# Patient Record
Sex: FEMALE | Race: WHITE
[De-identification: names, ages, dates, MRNs, and addresses within clinical notes are randomized per-mention and may not be internally consistent; named-entity substitution may affect disease eponyms.]

---

## 2018-07-12 ENCOUNTER — HOSPITAL ENCOUNTER (OUTPATIENT)
Dept: HOSPITAL 97 - ER | Age: 68
Setting detail: OBSERVATION
LOS: 1 days | Discharge: HOME | End: 2018-07-13
Attending: FAMILY MEDICINE | Admitting: FAMILY MEDICINE
Payer: COMMERCIAL

## 2018-07-12 VITALS — BODY MASS INDEX: 28.6 KG/M2

## 2018-07-12 DIAGNOSIS — D46.9: ICD-10-CM

## 2018-07-12 DIAGNOSIS — I25.10: ICD-10-CM

## 2018-07-12 DIAGNOSIS — I13.0: ICD-10-CM

## 2018-07-12 DIAGNOSIS — E87.5: ICD-10-CM

## 2018-07-12 DIAGNOSIS — R53.1: Primary | ICD-10-CM

## 2018-07-12 DIAGNOSIS — J44.9: ICD-10-CM

## 2018-07-12 DIAGNOSIS — N18.9: ICD-10-CM

## 2018-07-12 DIAGNOSIS — K21.9: ICD-10-CM

## 2018-07-12 DIAGNOSIS — I50.9: ICD-10-CM

## 2018-07-12 LAB
ALBUMIN SERPL BCP-MCNC: 3.1 G/DL (ref 3.4–5)
ALP SERPL-CCNC: 240 U/L (ref 45–117)
ALT SERPL W P-5'-P-CCNC: 33 U/L (ref 12–78)
AST SERPL W P-5'-P-CCNC: 22 U/L (ref 15–37)
BUN BLD-MCNC: 48 MG/DL (ref 7–18)
GLUCOSE SERPLBLD-MCNC: 108 MG/DL (ref 74–106)
HCT VFR BLD CALC: 29.5 % (ref 36–45)
INR BLD: 1.13
LYMPHOCYTES # SPEC AUTO: 1.3 K/UL (ref 0.7–4.9)
MAGNESIUM SERPL-MCNC: 2.7 MG/DL (ref 1.8–2.4)
MCH RBC QN AUTO: 31.8 PG (ref 27–35)
MCV RBC: 90.9 FL (ref 80–100)
NT-PROBNP SERPL-MCNC: 808 PG/ML (ref ?–125)
PMV BLD: 8.1 FL (ref 7.6–11.3)
POTASSIUM SERPL-SCNC: 6 MMOL/L (ref 3.5–5.1)
RBC # BLD: 3.25 M/UL (ref 3.86–4.86)

## 2018-07-12 PROCEDURE — 84439 ASSAY OF FREE THYROXINE: CPT

## 2018-07-12 PROCEDURE — 82607 VITAMIN B-12: CPT

## 2018-07-12 PROCEDURE — 71045 X-RAY EXAM CHEST 1 VIEW: CPT

## 2018-07-12 PROCEDURE — 86850 RBC ANTIBODY SCREEN: CPT

## 2018-07-12 PROCEDURE — 83540 ASSAY OF IRON: CPT

## 2018-07-12 PROCEDURE — 85025 COMPLETE CBC W/AUTO DIFF WBC: CPT

## 2018-07-12 PROCEDURE — 80069 RENAL FUNCTION PANEL: CPT

## 2018-07-12 PROCEDURE — 83880 ASSAY OF NATRIURETIC PEPTIDE: CPT

## 2018-07-12 PROCEDURE — 85730 THROMBOPLASTIN TIME PARTIAL: CPT

## 2018-07-12 PROCEDURE — 96375 TX/PRO/DX INJ NEW DRUG ADDON: CPT

## 2018-07-12 PROCEDURE — 80048 BASIC METABOLIC PNL TOTAL CA: CPT

## 2018-07-12 PROCEDURE — 86900 BLOOD TYPING SEROLOGIC ABO: CPT

## 2018-07-12 PROCEDURE — 86901 BLOOD TYPING SEROLOGIC RH(D): CPT

## 2018-07-12 PROCEDURE — 36415 COLL VENOUS BLD VENIPUNCTURE: CPT

## 2018-07-12 PROCEDURE — 94760 N-INVAS EAR/PLS OXIMETRY 1: CPT

## 2018-07-12 PROCEDURE — 94640 AIRWAY INHALATION TREATMENT: CPT

## 2018-07-12 PROCEDURE — 84443 ASSAY THYROID STIM HORMONE: CPT

## 2018-07-12 PROCEDURE — 82728 ASSAY OF FERRITIN: CPT

## 2018-07-12 PROCEDURE — 85610 PROTHROMBIN TIME: CPT

## 2018-07-12 PROCEDURE — 97163 PT EVAL HIGH COMPLEX 45 MIN: CPT

## 2018-07-12 PROCEDURE — 76770 US EXAM ABDO BACK WALL COMP: CPT

## 2018-07-12 PROCEDURE — 81003 URINALYSIS AUTO W/O SCOPE: CPT

## 2018-07-12 PROCEDURE — 84466 ASSAY OF TRANSFERRIN: CPT

## 2018-07-12 PROCEDURE — 96361 HYDRATE IV INFUSION ADD-ON: CPT

## 2018-07-12 PROCEDURE — 93005 ELECTROCARDIOGRAM TRACING: CPT

## 2018-07-12 PROCEDURE — 96365 THER/PROPH/DIAG IV INF INIT: CPT

## 2018-07-12 PROCEDURE — 85044 MANUAL RETICULOCYTE COUNT: CPT

## 2018-07-12 PROCEDURE — 86870 RBC ANTIBODY IDENTIFICATION: CPT

## 2018-07-12 PROCEDURE — 80076 HEPATIC FUNCTION PANEL: CPT

## 2018-07-12 PROCEDURE — 82746 ASSAY OF FOLIC ACID SERUM: CPT

## 2018-07-12 PROCEDURE — 83970 ASSAY OF PARATHORMONE: CPT

## 2018-07-12 PROCEDURE — 80053 COMPREHEN METABOLIC PANEL: CPT

## 2018-07-12 PROCEDURE — 99284 EMERGENCY DEPT VISIT MOD MDM: CPT

## 2018-07-12 PROCEDURE — 83735 ASSAY OF MAGNESIUM: CPT

## 2018-07-12 PROCEDURE — 84484 ASSAY OF TROPONIN QUANT: CPT

## 2018-07-12 RX ADMIN — MORPHINE SULFATE SCH MG: 15 TABLET, FILM COATED, EXTENDED RELEASE ORAL at 21:09

## 2018-07-12 RX ADMIN — SODIUM CHLORIDE SCH MLS: 0.9 INJECTION, SOLUTION INTRAVENOUS at 17:56

## 2018-07-12 RX ADMIN — CARVEDILOL SCH MG: 25 TABLET, FILM COATED ORAL at 21:08

## 2018-07-12 RX ADMIN — ALLOPURINOL SCH MG: 100 TABLET ORAL at 21:09

## 2018-07-12 RX ADMIN — Medication SCH: at 21:00

## 2018-07-12 RX ADMIN — IPRATROPIUM BROMIDE SCH MG: 0.5 SOLUTION RESPIRATORY (INHALATION) at 19:29

## 2018-07-12 RX ADMIN — SODIUM BICARBONATE TAB 325 MG SCH MG: 325 TAB at 21:08

## 2018-07-12 RX ADMIN — ALBUTEROL SULFATE SCH MG: 2.5 SOLUTION RESPIRATORY (INHALATION) at 19:29

## 2018-07-12 RX ADMIN — HYDRALAZINE HYDROCHLORIDE SCH MG: 25 TABLET, FILM COATED ORAL at 21:08

## 2018-07-12 NOTE — CON
Date of Consultation:  07/12/2018



Consulting Physician:  Dr. Suzette Alfred.



Reason For Consultation:  Elevated BUN and creatinine, hyperkalemia.



History Of Present Illness:  This is a pleasant 68-year-old female with significant past medical hist
ory of coronary artery disease status post MI back in February this year, complicated with congestive
 heart failure, MDS, COPD, osteoarthritis.  According to the patient, she is known to have chronic ki
dney disease, but she does not know the degree of it and the patient apparently had chronic acidosis 
being on oral sodium bicarb.  The patient came visiting from [facility], started having some diarrhea,
 abdominal pain, nausea without any vomiting for the last couple of days.  For that reason, reported 
to the emergency room.  In the emergency room, a primary workup showed elevated BUN and creatinine, B
UN 48, creatinine 2.8 with GFR around 17.  For that reason, the patient is admitted and we have been 
consulted.  The patient denied taking any nonsteroidal.  No recent change in her medication.  The [facility]
ient had contrast with cardiac cath and PTCA back in April of this year. 



The patient started on treatment for her hyperkalemia and we have been consulted. 



Blood pressure has been stable for the patient.



Past Medical History:  Include:

1.Hypertension.

2.Coronary artery disease status post PTCA status post MI back in April of this year.

3.COPD.

4.MDS.

5.Osteoarthritis.



Social History:  The patient is [place] from [place] of state.  Denies smoking, denies drinking, denies 
drug abuse.



Past Surgical History:  Include PTCA.



Allergies:  NO KNOWN DRUGS ALLERGY.



Home Medications:  Include:

1.Pepcid.

2.Pantoprazole.

3.Iron sulfate.

4.Sodium bicarb.



Review of Systems:

Head and Neck:  No red eye.  No ear pain. 

GI:  Has nausea, has diarrhea. 

:  No polyuria.  No dysuria.  No hematuria. 

GYN:  No vaginal discharge. 

Respiratory:  No shortness of breath. 

Cardiovascular:  No chest pain. 

Neuro:  No neuropathy. 

Musculoskeletal:  No joint pain. 

Endocrine:  No polydipsia. 

Skin:  No rash.



Physical Examination:

Vital Signs:  When I saw the patient, blood pressure 110/78, pulse of 88. 

Chest:  Clear to auscultation. 

Heart:  S1, S2.  Regular. 

Abdomen:  Soft, nontender. 

Extremities:  No edema.



Laboratory Data:  Sodium 136, potassium 6, bicarb 21, BUN 48, creatinine 2.8, calcium 9.1, magnesium 
2.7.  LFT within normal limit.  , albumin 3.1.  WBC 10.3, H and H 10.3/29.5, platelets 307, IN
R 1.1.



Assessment And Plan:  

1.Renal failure, mostly chronic kidney disease secondary to cardiorenal, stable with hyperkalemia, m
ostly had component of acute kidney injury secondary to prerenal.  I do not see any medication.

2.I agree with the IV fluid.  We will bolus the patient with 500 of normal saline and we will send f
or full workup.

3.Hyperkalemia secondary to renal failure.  I am going to go ahead and send for fecal occult, send f
or TSH and we will start aggressive hydration to compensate.  Give the patient albuterol D50 insulin 
and calcium gluconate and we will follow up.  Repeat.

4.Coronary artery disease.  Stable.  

5.Acidosis.  We will continue her oral bicarb.  

The patient's case is discussed with Dr. Alfred, agreed on the plan.  Discussed with the patient, verb
alized understanding.





MA/CARLIN

DD:  07/12/2018 14:04:04Voice ID:  [id number]

DT:  07/12/2018 14:34:32Report ID:  [id number]

## 2018-07-12 NOTE — EKG
Test Date:    2018-07-12               Test Time:    13:04:28

Technician:   BOBBI                                     

                                                     

MEASUREMENT RESULTS:                                       

Intervals:                                           

Rate:         69                                     

KS:           162                                    

QRSD:         90                                     

QT:           420                                    

QTc:          450                                    

Axis:                                                

P:            33                                     

KS:           162                                    

QRS:          50                                     

T:            61                                     

                                                     

INTERPRETIVE STATEMENTS:                                       

                                                     

Normal sinus rhythm

Cannot rule out Anterior infarct, age undetermined

Abnormal ECG

No previous ECG available for comparison



Electronically Signed On 07-12-18 15:06:28 CDT by Antonio Colvin

## 2018-07-12 NOTE — HP
Date of Admission:  07/12/2018



Consultants:  Deandre Khan M.D., with Nephrology.



Primary Care Physician:  [facility] [place], [place]



Chief Complaint:  Generalized weakness.



History Of Present Illness:  The patient is a 68-year-old female with past 
medical history of myelodysplastic syndrome, COPD, congestive heart failure, 
chronic kidney disease, coronary artery disease, hypertension, GERD, comes in 
with 2-day history of generalized weakness, fatigue, some nausea, no vomiting.  
No fever.  However, the patient does have some chills.  Denies any cough, chest 
pain, or ill contacts.  Does report some shortness of breath, which is a little 
bit above her baseline.  The patient's symptoms are constant, moderate, 
progressively worsening.  When she came into the ER, her vital signs were 
stable.  She was afebrile.  Her workup revealed a potassium level of 6, 
creatinine was 2.8, with unknown baseline, however, the patient does have 
chronic kidney disease.  Her white count was normal.  Her chest x-ray did not 
reveal any edema or infiltrates.  The patient was given medications to reduce 
her potassium level including insulin, calcium, and Kayexalate.  The patient 
was then referred for admission.  When seen in the ER, she was awake, alert, 
oriented x3, not in any acute distress.



Past Medical History:  Myelodysplastic syndrome, COPD, chronic kidney disease, 
congestive heart failure, coronary artery disease.  GERD, and hypertension.  
The patient has received 12 transfusions in the past for her myelodysplasia.



Past Surgical History:  Ankle surgery on the left with pinning, multiple 
surgeries for trauma to the abdomen and back.



Allergies:  EZETIMIBE, NIACIN, ROSUVASTATIN, LORAZEPAM.



Medications:  List reviewed.



Social History:  The patient lives in [place], visiting from out of [facility].  Has 
2 sons.  Good social support.  Mainly independent in her activities of daily 
living.



Family History:  No history of premature coronary artery disease in the family.



Review of Systems:

An 11-point system reviewed, negative except as per HPI.



Physical Examination:

Vital Signs:  Temperature 97.9, heart rate 64, blood pressure 127/62, 
respirations 22, and O2 saturation 97% on room air. 

General:  Awake, alert, oriented x3, in some mild distress.  Elderly female, 
somewhat ill-appearing, obese. 

HEENT:  Normocephalic, atraumatic.  PERRLA.  EOMI.  Dry mucous membranes.  
Oropharynx is clear.  Poor dentition.  Conjunctivae anicteric. 

Neck:  Supple.  No JVD.  Trachea midline. 

CV:  S1 and S2.  Regular rate and rhythm.  Peripheral pulses are present. 

Respiratory:  Clear to auscultation bilaterally.  No wheezing.  No stridor.  No 
use of accessory muscles Gastrointestinal:  Abdomen is soft, nontender, 
nondistended.  Positive bowel sounds.  No guarding or rigidity. 

Extremities:  No clubbing, cyanosis.  The patient does have some edema on the 
right lower extremity. 

Neuro:  Cranial nerves 2 through 12 intact grossly.  No focal neurological 
deficit.  Speech is normal.  Sensation intact to light touch.  Strength is 
symmetric in bilateral upper and lower extremities. 

Skin:  No rashes, normal skin turgor. 

Psych:  Mood is okay.  Affect is full.  Insight and judgment are good.



Laboratory Data:  Sodium 136, potassium 6, chloride 107, CO2 21, BUN 48, 
creatinine 2.8, glucose 108, calcium 9.1, and magnesium 2.7.  Troponin less 
than 0.02.  .  Albumin 3.1.  UA, negative nitrite, trace leukocyte 
esterase, trace glucose, 3+ protein.  INR 1.13.  WBC 10.3, H and H 10.3, 29.5, 
platelets 307, and neutrophils 75.1.  Chest x-ray shows no acute abnormalities 
displayed.  Renal ultrasound shows echogenic kidneys bilaterally, most 
compatible with medical renal disease.  Benign bilateral renal cysts.  EKG, 
normal sinus rhythm, rate of 69, no ST changes.



Assessment And Plan:  A 68-year-old female with;

1.   Generalized weakness.  We will obtain PT eval, likely secondary to 
electrolyte disturbance.

2.   Hyperkalemia.  The patient has already received calcium, insulin, dextrose 
and Kayexalate.  We will repeat potassium level and continue to monitor.

3.   Chronic kidney disease.  Unknown baseline.  The patient does have some 
acute characteristics to the kidney injury.  Continue with IV fluid hydration.  
The patient is on the dry side.  Dr. Khan with Nephrology has been 
consulted.

4.   Myelodysplastic syndrome.  We will monitor H and H.

5.   Chronic obstructive pulmonary disease, chronic bronchitis.  We will 
continue nebulizer treatments.

6.   Congestive heart failure.  Unknown EF, chronic, compensated.

7.   Coronary artery disease native artery and native heart without angina.

8.   Gastroesophageal reflux disease on PPI.

9.   Essential hypertension stable.  We will resume home medications once 
reconciled.

10.   Gastrointestinal and deep venous thrombosis prophylaxis with PPI and 
Lovenox renally dosed.



Plan:  Admit the patient to Med-Surg, [facility] as observation.

Code status: Full code





SA/MODL

DD:  07/12/2018 16:37:32   Voice ID:  [id number]

MTDD

## 2018-07-12 NOTE — RAD REPORT
EXAM DESCRIPTION:  US - Renal Ultrasound-Complete - 7/12/2018 2:40 pm

 

CLINICAL HISTORY:  NATALIA

Flank pain

 

COMPARISON:  No comparisons

 

FINDINGS:  Both kidneys are echogenic. Several benign renal cysts are present bilaterally.

 

The right kidney measures 9.5 x 5.2 x 5.2 cm. No hydronephrosis, focal mass or perinephric fluid.

 

The left kidney measures 9.6 x 5.4 x 4.8 cm. No hydronephrosis, focal mass or perinephric fluid.

 

The urinary bladder is incompletely distended without gross abnormality seen.

 

IMPRESSION:  Echogenic kidneys bilaterally most compatible with medical renal disease.

 

Benign bilateral renal cysts.

## 2018-07-12 NOTE — EDPHYS
Physician Documentation                                                                           

 [facility]                                                                

Name: Radha Leiva                                                                               

Age: 68 yrs                                                                                       

Sex: Female                                                                                       

: 1950                                                                                   

MRN: [id number]                                                                                   

Arrival Date: 2018                                                                          

Time: 11:13                                                                                       

Account#: [id number]                                                                            

Bed 5                                                                                             

Private MD: out of town, doctor                                                                   

ED Physician Guero Banda                                                                       

HPI:                                                                                              

                                                                                             

07:18 This 68 yrs old  Female presents to ER via Wheelchair with complaints of       kdr 

      Weakness light headed .                                                                     

07:18 The patient has a history of anemia and 12 transfusions last year. none this year and   kdr 

      since yesterday, the patient c/o her typical pre-transfusion weakness similar to her        

      prior episodes where she required transfusions. She has had no other c/o in the ED..        

      Onset: The symptoms/episode began/occurred yesterday. Severity of symptoms: At their        

      worst the symptoms were mild in the emergency department the symptoms are unchanged.        

      The patient has experienced similar episodes in the past, multiple times. The patient       

      has not recently seen a physician.                                                          

                                                                                                  

Historical:                                                                                       

- Allergies:                                                                                      

                                                                                             

11:23 Ativan;                                                                                 aj1 

11:23 Crestor;                                                                                aj1 

11:23 Zetia;                                                                                  aj1 

11:23 Niacin;                                                                                 aj1 

- Home Meds:                                                                                      

11:23 cyclobenzaprine 10 mg Oral tab 1 tab 3 times per day [Active]; MS Contin 15 mg Oral     aj1 

      TbER 1 tab every 8 hours [Active]; pantoprazole 40 mg oral TbEC 1 tab once daily            

      [Active]; Percocet  mg Oral tab 1 tab every 4 hours [Active]; Plavix 75 mg Oral       

      tab 1 tab once daily [Active]; Sodium Bicarbonate 150 mg Oral twice a day [Active];         

      allopurinol 100 mg Oral tab 1 tab 2 times per day [Active]; aspirin 81 mg Oral chew 1       

      tab once daily [Active]; Atarax Oral 25 mg daily [Active]; calcium plus vitamin d twice     

      a day [Active]; carvedilol 25 mg oral tab 1 tab 2 times per day [Active]; Combivent         

       mcg/actuation Inhl aero 2 puffs 4 times per day [Active]; famotidine 40 mg Oral      

      tab 1 tab once daily [Active]; Ferrous Sulfate Oral twice daily [Active]; hydralazine       

      50 mg Oral tab 1 tab 2 times per day [Active]; Lasix 80 mg Oral tab 1 tab once daily        

      [Active]; omeprazole 20 mg Oral cpDR 1 cap once daily [Active]; Paxil 40 mg Oral tab 1      

      tab once daily [Active]; Zyrtec 10 mg Oral chew 1 tab once daily [Active];                  

- PMHx:                                                                                           

11:23 Anemia; COPD; CHF; myelodysplatic syndrome; GERD;                                       aj1 

                                                                                                  

- Immunization history:: Flu vaccine is not up to date.                                           

- Social history:: Smoking status: Patient/guardian denies using tobacco.                         

- Ebola Screening: : Patient denies travel to an Ebola-affected area in the 21 days               

  before illness onset.                                                                           

                                                                                                  

                                                                                                  

ROS:                                                                                              

                                                                                             

07:18 Constitutional: Negative for fever, chills, and weight loss - she has been generally    kdr 

      weak Eyes: Negative for injury, pain, redness, and discharge, ENT: Negative for injury,     

      pain, and discharge, Neck: Negative for injury, pain, and swelling, Cardiovascular:         

      Negative for chest pain, palpitations, and edema, Respiratory: Negative for shortness       

      of breath, cough, wheezing, and pleuritic chest pain, Abdomen/GI: Negative for              

      abdominal pain, nausea, vomiting, diarrhea, and constipation, Back: Negative for injury     

      and pain, : Negative for injury, bleeding, discharge, and swelling, MS/Extremity:         

      Negative for injury and deformity, Skin: Negative for injury, rash, and discoloration,      

      Neuro: Negative for headache, weakness, numbness, tingling, and seizure activity.           

      Psych: Negative for depression, anxiety, suicide ideation, homicidal ideation, and          

      hallucinations, Allergy/Immunology: Negative for hives, rash, and allergies, Endocrine:     

      Negative for neck swelling, polydipsia, polyuria, polyphagia, and marked weight             

      changes, Hematologic/Lymphatic: Negative for swollen nodes, abnormal bleeding, and          

      unusual bruising.                                                                           

                                                                                                  

Exam:                                                                                             

07:18 Constitutional:  This is a well developed, well nourished patient who is awake, alert,  kdr 

      and in no acute distress. Head/Face:  Normocephalic, atraumatic. Eyes:  Pupils equal        

      round and reactive to light, extra-ocular motions intact.  Lids and lashes normal.          

      Conjunctiva and sclera are non-icteric and not injected.  Cornea within normal limits.      

      Periorbital areas with no swelling, redness, or edema. Neck:  Trachea midline, no           

      thyromegaly or masses palpated, and no cervical lymphadenopathy.  Supple, full range of     

      motion without nuchal rigidity, or vertebral point tenderness.  No Meningismus.             

      Chest/axilla:  Normal chest wall appearance and motion.  Nontender with no deformity.       

      No lesions are appreciated. Cardiovascular:  Regular rate and rhythm with a normal S1       

      and S2.  No gallops, murmurs, or rubs.  Normal PMI, no JVD.  No pulse deficits.             

      Respiratory:  Lungs have equal breath sounds bilaterally, clear to auscultation and         

      percussion.  No rales, rhonchi or wheezes noted.  No increased work of breathing, no        

      retractions or nasal flaring. Abdomen/GI:  Soft, non-tender, with normal bowel sounds.      

      No distension or tympany.  No guarding or rebound.  No evidence of tenderness               

      throughout. Back:  No spinal tenderness.  No costovertebral tenderness.  Full range of      

      motion. Skin:  Warm, dry with normal turgor.  Normal color with no rashes, no lesions,      

      and no evidence of cellulitis. MS/ Extremity:  Pulses equal, no cyanosis.                   

      Neurovascular intact.  Full, normal range of motion. Neuro:  Awake and alert, GCS 15,       

      oriented to person, place, time, and situation.  Cranial nerves II-XII grossly intact.      

      Motor strength 5/5 in all extremities.  Sensory grossly intact.  Cerebellar exam            

      normal.  Normal gait. Psych:  Awake, alert, with orientation to person, place and time.     

       Behavior, mood, and affect are within normal limits.                                       

                                                                                                  

Vital Signs:                                                                                      

                                                                                             

11:23  / 62; Pulse 64; Resp 22; Temp 97.9; Pulse Ox 97% on R/A; Weight 78.02 kg (R);    aj1 

      Height 5 ft. 5 in. (165.10 cm) (R); Pain 8/10;                                              

12:39  / 66; Pulse 69; Resp 17; Pulse Ox 100% on R/A;                                   hb  

14:00  / 77; Pulse 75; Resp 18; Pulse Ox 99% ;                                          sv  

15:08 Pulse 75; Resp 15; Pulse Ox 100% ;                                                      sv  

16:07  / 74; Pulse 80; Resp 24; Pulse Ox 96% on R/A;                                    sv  

11:23 Body Mass Index 28.62 (78.02 kg, 165.10 cm)                                             aj1 

                                                                                                  

MDM:                                                                                              

13:06 Patient medically screened.                                                             kdr 

                                                                                             

07:18 Data reviewed: vital signs, nurses notes, lab test result(s), EKG, radiologic studies.  kdr 

      Counseling: I had a detailed discussion with the patient and/or guardian regarding: the     

      historical points, exam findings, and any diagnostic results supporting the                 

      discharge/admit diagnosis, lab results, radiology results, the need for further work-up     

      and treatment in the hospital.                                                              

                                                                                                  

                                                                                             

11:48 Order name: Basic Metabolic Panel; Complete Time: 12:57                                 kdr 

                                                                                             

11:48 Order name: CBC with Diff; Complete Time: 12:57                                         kdr 

                                                                                             

11:48 Order name: LFT's; Complete Time: 12:57                                                 kdr 

                                                                                             

11:48 Order name: Magnesium; Complete Time: 12:57                                             kdr 

                                                                                             

11:48 Order name: NT PRO-BNP; Complete Time: 12:57                                            kdr 

                                                                                             

11:48 Order name: PT-INR; Complete Time: 12:57                                                kdr 

                                                                                             

11:48 Order name: Ptt, Activated; Complete Time: 12:57                                        [facility] 

                                                                                             

11:48 Order name: Troponin (emerg Dept Use Only); Complete Time: 12:57                        [facility] 

                                                                                             

11:48 Order name: XRAY Chest (1 view); Complete Time: 12:57                                   [facility] 

                                                                                             

11:48 Order name: Type And Screen                                                             [facility] 

                                                                                             

13:05 Order name: Antibody Identification                                                     [facility]

                                                                                             

14:07 Order name: Urine Dipstick--Ancillary (enter results)                                     

                                                                                             

14:21 Order name: ABO/RH no charge                                                            [facility]

                                                                                             

14:33 Order name: Urine Dipstick-Ancillary                                                    [facility]

                                                                                             

11:48 Order name: EKG; Complete Time: 11:49                                                   kdr 

                                                                                             

11:48 Order name: Cardiac monitoring                                                          [facility] 

                                                                                             

11:48 Order name: EKG - Nurse/Tech; Complete Time: 15:52                                      kdr 

                                                                                             

11:48 Order name: IV Saline Lock; Complete Time: 15:52                                        kdr 

                                                                                             

11:48 Order name: Labs collected and sent; Complete Time: 15:52                               kdr 

                                                                                             

11:48 Order name: O2 Per Protocol; Complete Time: 15:52                                       kdr 

                                                                                             

11:48 Order name: O2 Sat Monitoring; Complete Time: 15:52                                     kdr 

                                                                                             

11:48 Order name: Urine Dipstick-Ancillary (obtain specimen); Complete Time: 14:09            kdr 

                                                                                             

15:10 Order name:                                                                           EDMS

                                                                                                  

Administered Medications:                                                                         

                                                                                             

13:20 Drug: Kayexalate 60 grams Route: PO;                                                    hb  

14:15 Follow up: Response: No adverse reaction                                                hb  

13:22 Drug: D50W 50 ml Route: IVP; Site: right antecubital;                                   hb  

14:00 Follow up: Response: No adverse reaction                                                hb  

13:22 Drug: Insulin Regular Human 10 units {Co-Signature: sv (Mer Lieberman RN).} Route:    hb  

      IVP; Site: right antecubital;                                                               

14:00 Follow up: Response: No adverse reaction                                                hb  

13:50 Drug: Calcium Gluconate 1 grams Route: IVPB; Infused Over: 60 mins; Site: right         hb  

      antecubital;                                                                                

15:00 Follow up: Response: No adverse reaction; IV Status: Completed infusion                 hb  

14:10 Drug: NS 0.9% 500 ml Route: IV; Rate: bolus; Site: right antecubital;                   hb  

15:00 Follow up: Response: No adverse reaction; IV Status: Completed infusion                 hb  

14:10 Drug: Albuterol 10 mg Route: Inhalation;                                                hb  

15:50 Drug: NS 0.9% 1000 ml Route: IV; Rate: 75 ml/hr; Site: right antecubital;               hb  

16:17 Follow up: Response: No adverse reaction; IV Status: Infusion continued upon admission  sv  

                                                                                                  

                                                                                                  

Disposition:                                                                                      

18 13:06 Hospitalization ordered by Suzette Alfred for Observation. Preliminary diagnosis    

  are Weakness, Hyperkalemia.                                                                     

- Bed requested for Telemetry/MedSurg (observation).                                              

- Status is Observation.                                                                      sv  

- Condition is Fair.                                                                              

- Problem is new.                                                                                 

- Symptoms have improved.                                                                         

UTI on Admission? No                                                                              

                                                                                                  

                                                                                                  

                                                                                                  

Signatures:                                                                                       

Dispatcher [employer]                           [facility]                                                 

Zamzam Menon RN                     RN   ajMer Carter RN RN   sv                                                   

Guero Banda MD MD kdr Gallardo, Ana ag Baxter, Heather, RN RN                                                      

Mer goodwin                                                   

                                                                                                  

Corrections: (The following items were deleted from the chart)                                    

14:56 13:06 Hospitalization Ordered by Suzette Alfred MD for Observation. Preliminary diagnosis ag  

      is Weakness; Hyperkalemia. Bed requested for Telemetry/MedSurg (observation). Status is     

      Observation. Condition is Fair. Problem is new. Symptoms have improved. UTI on              

      Admission? No. kdr                                                                          

16:22 14:56 2018 13:06 Hospitalization Ordered by Suzette Alfred MD for Observation.      sv  

      Preliminary diagnosis is Weakness; Hyperkalemia. Bed requested for Telemetry/MedSurg        

      (observation). Status is Observation. Condition is Fair. Problem is new. Symptoms have      

      improved. UTI on Admission? No. ag                                                          

                                                                                                  

**************************************************************************************************

## 2018-07-12 NOTE — ER
Nurse's Notes                                                                                     

 [facility]                                                                

Name: Radha Leiva                                                                               

Age: 68 yrs                                                                                       

Sex: Female                                                                                       

: 1950                                                                                   

MRN: [id number]                                                                                   

Arrival Date: 2018                                                                          

Time: 11:13                                                                                       

Account#: [id number]                                                                            

Bed 5                                                                                             

Private MD: out of town, doctor                                                                   

Diagnosis: Weakness;Hyperkalemia                                                                  

                                                                                                  

Presentation:                                                                                     

                                                                                             

11:14 Presenting complaint: Patient states: "I'm feeling very weak and a little nauseous, I   aj1 

      have MDS (Myelodysplastic syndrome) and I'm anemic a lot, I feel like my blood count        

      has dropped" Reports generalized weakness and shortness of breath that started              

      yesterday and got worse this morning. Transition of care: patient was not received from     

      another setting of care. No acute neurological deficit is noted. Onset of symptoms was      

      2018. Risk Assessment: Do you want to hurt yourself or someone else? Patient       

      reports no desire to harm self or others. Initial Sepsis Screen: Does the patient meet      

      any 2 criteria? No. Patient's initial sepsis screen is negative. Does the patient have      

      a suspected source of infection? No. Patient's initial sepsis screen is negative. Care      

      prior to arrival: None.                                                                     

11:14 Method Of Arrival: Ambulatory                                                           aj1 

11:14 Method Of Arrival: Wheelchair                                                           aj1 

11:14 Acuity: IMAN 3                                                                           aj1 

                                                                                                  

Triage Assessment:                                                                                

11:23 The onset of the patients symptoms was 2018 at 06:00. General: Appears in no   aj1 

      apparent distress. uncomfortable, Behavior is calm, cooperative, appropriate for age.       

      Pain: Complains of pain in generalized, states "all my bones" Pain currently is 8 out       

      of 10 on a pain scale. Neuro: Level of Consciousness is awake, alert, obeys commands,       

      Speech is normal, Reports weakness. Cardiovascular: Patient's skin is warm and dry.         

      Respiratory: Reports shortness of breath Airway is patent Respiratory effort is even,       

      unlabored, Respiratory pattern is regular, symmetrical.                                     

                                                                                                  

Stroke Activation: Symptom onset > 6 hours                                                        

 Physician: Stroke Attending; Name: ; Notified At: ; Arrived At:                                  

 Physician: Chief Stroke Resident; Name: ; Notified At: ; Arrived At:                             

 Physician: Stroke Resident; Name: ; Notified At: ; Arrived At:                                   

 Physician: ED Attending; Name: ; Notified At: ; Arrived At:                                      

 Physician: ED Resident; Name: ; Notified At: ; Arrived At:                                       

                                                                                                  

Historical:                                                                                       

- Allergies:                                                                                      

11:23 Ativan;                                                                                 aj1 

11:23 Crestor;                                                                                aj1 

11:23 Zetia;                                                                                  aj1 

11:23 Niacin;                                                                                 aj1 

- Home Meds:                                                                                      

11:23 cyclobenzaprine 10 mg Oral tab 1 tab 3 times per day [Active]; MS Contin 15 mg Oral     aj1 

      TbER 1 tab every 8 hours [Active]; pantoprazole 40 mg oral TbEC 1 tab once daily            

      [Active]; Percocet  mg Oral tab 1 tab every 4 hours [Active]; Plavix 75 mg Oral       

      tab 1 tab once daily [Active]; Sodium Bicarbonate 150 mg Oral twice a day [Active];         

      allopurinol 100 mg Oral tab 1 tab 2 times per day [Active]; aspirin 81 mg Oral chew 1       

      tab once daily [Active]; Atarax Oral 25 mg daily [Active]; calcium plus vitamin d twice     

      a day [Active]; carvedilol 25 mg oral tab 1 tab 2 times per day [Active]; Combivent         

       mcg/actuation Inhl aero 2 puffs 4 times per day [Active]; famotidine 40 mg Oral      

      tab 1 tab once daily [Active]; Ferrous Sulfate Oral twice daily [Active]; hydralazine       

      50 mg Oral tab 1 tab 2 times per day [Active]; Lasix 80 mg Oral tab 1 tab once daily        

      [Active]; omeprazole 20 mg Oral cpDR 1 cap once daily [Active]; Paxil 40 mg Oral tab 1      

      tab once daily [Active]; Zyrtec 10 mg Oral chew 1 tab once daily [Active];                  

- PMHx:                                                                                           

11:23 Anemia; COPD; CHF; myelodysplatic syndrome; GERD;                                       aj1 

                                                                                                  

- Immunization history:: Flu vaccine is not up to date.                                           

- Social history:: Smoking status: Patient/guardian denies using tobacco.                         

- Ebola Screening: : Patient denies travel to an Ebola-affected area in the 21 days               

  before illness onset.                                                                           

                                                                                                  

                                                                                                  

Screenin:17 Abuse screen: Denies threats or abuse. Denies injuries from another. Nutritional        hb  

      screening: No deficits noted. Tuberculosis screening: No symptoms or risk factors           

      identified. Fall Risk None identified.                                                      

                                                                                                  

Assessment:                                                                                       

11:50 General: Appears in no apparent distress. Behavior is calm, cooperative. Pain: Denies   hb  

      pain. Neuro: Level of Consciousness is awake, alert, obeys commands, Oriented to            

      person, place, time, situation. Cardiovascular: Capillary refill < 3 seconds Patient's      

      skin is warm and dry. Respiratory: Airway is patent Trachea midline Respiratory effort      

      is even, unlabored, Respiratory pattern is regular, symmetrical, Breath sounds are          

      clear bilaterally. GI: No signs and/or symptoms were reported involving the                 

      gastrointestinal system. : No signs and/or symptoms were reported regarding the           

      genitourinary system. EENT: No signs and/or symptoms were reported regarding the EENT       

      system. Derm: No signs and/or symptoms reported regarding the dermatologic system. Skin     

      is intact, is healthy with good turgor, Skin is dry, Skin is pale, Skin temperature is      

      warm. Musculoskeletal: No signs and/or symptoms reported regarding the musculoskeletal      

      system.                                                                                     

12:39 Reassessment: Patient appears in no apparent distress at this time. No changes from     hb  

      previously documented assessment. Patient and/or family updated on plan of care and         

      expected duration. Pain level reassessed. Patient is alert, oriented x 3, equal             

      unlabored respirations, skin warm/dry/pink.                                                 

15:15 Reassessment: Emi CUEVA to call back for report.                                         sv  

                                                                                                  

Vital Signs:                                                                                      

11:23  / 62; Pulse 64; Resp 22; Temp 97.9; Pulse Ox 97% on R/A; Weight 78.02 kg (R);    aj1 

      Height 5 ft. 5 in. (165.10 cm) (R); Pain 8/10;                                              

12:39  / 66; Pulse 69; Resp 17; Pulse Ox 100% on R/A;                                   hb  

14:00  / 77; Pulse 75; Resp 18; Pulse Ox 99% ;                                          sv  

15:08 Pulse 75; Resp 15; Pulse Ox 100% ;                                                      sv  

16:07  / 74; Pulse 80; Resp 24; Pulse Ox 96% on R/A;                                    sv  

11:23 Body Mass Index 28.62 (78.02 kg, 165.10 cm)                                             aj1 

                                                                                                  

ED Course:                                                                                        

11:13 Patient arrived in ED.                                                                  mr  

11:13 out of town, doctor is Private Physician.                                               mr  

11:17 Triage completed.                                                                       aj1 

11:23 Arm band placed on Patient placed in an exam room.                                      aj1 

11:28 Guero Banda MD is Attending Physician.                                              kdr 

11:36 Clarissa Zepeda, RN is Primary Nurse.                                                   hb  

12:00 Patient has correct armband on for positive identification. Placed in gown. Bed in low  hb  

      position. Call light in reach. Side rails up X 1.                                           

12:05 Initial lab(s) drawn, by me, sent to lab. Missed attempt(s): 22 gauge in right forearm. sv  

      Bleeding controlled, band aid applied, catheter tip intact.                                 

12:25 X-ray(s) taken.                                                                         sv  

12:29 X-ray completed. Portable x-ray completed in exam room. Patient tolerated procedure     kw  

      well.                                                                                       

12:33 XRAY Chest (1 view) In Process Unspecified.                                             EDMS

12:46 Notified ED physician of a critical lab result(s). K 6.0.                               hb  

13:06 Suzette Alfred MD is Hospitalizing Provider.                                            kdr 

13:14 Inserted saline lock: 24 gauge in right antecubital area, using aseptic technique.      iw  

13:19 EKG done, by EKG tech. reviewed by Guero Banda MD.                                    sm3 

15:14 Urine Dipstick--Ancillary (enter results) Sent.                                         sv  

                                                                                                  

Administered Medications:                                                                         

13:20 Drug: Kayexalate 60 grams Route: PO;                                                    hb  

14:15 Follow up: Response: No adverse reaction                                                hb  

13:22 Drug: D50W 50 ml Route: IVP; Site: right antecubital;                                   hb  

14:00 Follow up: Response: No adverse reaction                                                hb  

13:22 Drug: Insulin Regular Human 10 units {Co-Signature: sv (Mer Lieberman RN).} Route:    hb  

      IVP; Site: right antecubital;                                                               

14:00 Follow up: Response: No adverse reaction                                                hb  

13:50 Drug: Calcium Gluconate 1 grams Route: IVPB; Infused Over: 60 mins; Site: right         hb  

      antecubital;                                                                                

15:00 Follow up: Response: No adverse reaction; IV Status: Completed infusion                 hb  

14:10 Drug: NS 0.9% 500 ml Route: IV; Rate: bolus; Site: right antecubital;                   hb  

15:00 Follow up: Response: No adverse reaction; IV Status: Completed infusion                 hb  

14:10 Drug: Albuterol 10 mg Route: Inhalation;                                                hb  

15:50 Drug: NS 0.9% 1000 ml Route: IV; Rate: 75 ml/hr; Site: right antecubital;               hb  

16:17 Follow up: Response: No adverse reaction; IV Status: Infusion continued upon admission  sv  

                                                                                                  

                                                                                                  

Outcome:                                                                                          

13:06 Decision to Hospitalize by Provider.                                                    kdr 

16:22 Patient left the ED.                                                                    sv  

                                                                                                  

Signatures:                                                                                       

Dispatcher [employer]                           EDZamzam Gar RN RN   1                                                  

Mer Lieberman RN RN   sv                                                   

Guero Banda MD MD   [facility]                                                  

Poornima Jeffries                                                                                   

Siobhan Baker, Monisha Carrasquillo RN, Heather, RN RN                                                      

Nneka Carrero                              [facility]                                                  

Mer Lieberman RN                           sv                                                   

                                                                                                  

**************************************************************************************************

## 2018-07-12 NOTE — RAD REPORT
EXAM DESCRIPTION:  Daniel Single View7/12/2018 12:33 pm

 

CLINICAL HISTORY:  Abdominal

 

COMPARISON:  none

 

FINDINGS:   The lungs appear clear of acute infiltrate. The heart is mildly enlarged. Postsurgical ch
anges involve the chest

 

IMPRESSION:   No acute abnormalities displayed

## 2018-07-13 VITALS — OXYGEN SATURATION: 95 %

## 2018-07-13 VITALS — TEMPERATURE: 98 F | SYSTOLIC BLOOD PRESSURE: 140 MMHG | DIASTOLIC BLOOD PRESSURE: 60 MMHG

## 2018-07-13 LAB
ALBUMIN SERPL BCP-MCNC: 2.7 G/DL (ref 3.4–5)
ALP SERPL-CCNC: 207 U/L (ref 45–117)
ALT SERPL W P-5'-P-CCNC: 26 U/L (ref 12–78)
AST SERPL W P-5'-P-CCNC: 14 U/L (ref 15–37)
BUN BLD-MCNC: 41 MG/DL (ref 7–18)
FERRITIN SERPL-MCNC: 479.4 NG/ML (ref 8–388)
GLUCOSE SERPLBLD-MCNC: 91 MG/DL (ref 74–106)
HCT VFR BLD CALC: 24.1 % (ref 36–45)
IRON SERPL-MCNC: 40 UG/DL (ref 50–170)
LYMPHOCYTES # SPEC AUTO: 1.8 K/UL (ref 0.7–4.9)
MCH RBC QN AUTO: 31.3 PG (ref 27–35)
MCV RBC: 91.5 FL (ref 80–100)
PMV BLD: 7.8 FL (ref 7.6–11.3)
POTASSIUM SERPL-SCNC: 4.1 MMOL/L (ref 3.5–5.1)
RBC # BLD: 2.63 M/UL (ref 3.86–4.86)
TRANSFERRIN SERPL-MCNC: 165 MG/DL (ref 200–360)
TSH SERPL DL<=0.05 MIU/L-ACNC: 8.97 UIU/ML (ref 0.36–3.74)

## 2018-07-13 RX ADMIN — SODIUM CHLORIDE SCH: 0.9 INJECTION, SOLUTION INTRAVENOUS at 00:00

## 2018-07-13 RX ADMIN — MORPHINE SULFATE SCH MG: 15 TABLET, FILM COATED, EXTENDED RELEASE ORAL at 08:38

## 2018-07-13 RX ADMIN — ALBUTEROL SULFATE SCH MG: 2.5 SOLUTION RESPIRATORY (INHALATION) at 01:57

## 2018-07-13 RX ADMIN — CARVEDILOL SCH MG: 25 TABLET, FILM COATED ORAL at 08:39

## 2018-07-13 RX ADMIN — Medication SCH ML: at 08:40

## 2018-07-13 RX ADMIN — IPRATROPIUM BROMIDE SCH MG: 0.5 SOLUTION RESPIRATORY (INHALATION) at 01:57

## 2018-07-13 RX ADMIN — ALBUTEROL SULFATE SCH MG: 2.5 SOLUTION RESPIRATORY (INHALATION) at 07:55

## 2018-07-13 RX ADMIN — ALLOPURINOL SCH MG: 100 TABLET ORAL at 08:38

## 2018-07-13 RX ADMIN — SODIUM BICARBONATE TAB 325 MG SCH MG: 325 TAB at 08:39

## 2018-07-13 RX ADMIN — IPRATROPIUM BROMIDE SCH MG: 0.5 SOLUTION RESPIRATORY (INHALATION) at 07:55

## 2018-07-13 RX ADMIN — HYDRALAZINE HYDROCHLORIDE SCH MG: 25 TABLET, FILM COATED ORAL at 08:38

## 2018-07-13 NOTE — DS
Consultants:  Deandre Khan M.D. with Nephrology.



Discharge Diagnoses:  

1.Generalized weakness.

2.Hyperkalemia, corrected.

3.Chronic kidney disease.

4.Myelodysplastic syndrome.

5.Chronic obstructive pulmonary disease, chronic bronchitis.

6.Congestive heart failure.

7.Coronary artery disease, native artery and native heart, without angina.

8.Gastroesophageal reflux disease.

9.Essential hypertension.



Hospital Course:  The patient is 68-year-old female, comes in from out of town with a significant pas
t medical history of myelodysplastic syndrome, COPD, CHF, chronic kidney disease, comes in with gener
alized weakness.  She was found to be hyperkalemic.  She was given medications for reducing the potas
sium level.  Repeat level was normal.  The patient did well.  Her creatinine was 2.8 with unclear bas
miguel.  However, the patient does have established chronic kidney disease.  Her creatinine improved t
o 2.6.  She was hydrated with IV fluids.  Dr. Khan with Nephrology saw the patient as well.  The 
patient's medication list did not have any ACE inhibitors or any other medications that may have caus
ed hyperkalemia.  The patient's acidosis resolved.  She is feeling better.  She was worked with physi
vahid therapy.  She was evaluated and did not recommend any inpatient physical therapy.  The patient do
es use a cane at home.  The patient's vital signs were stable.  She felt significantly better.  She d
id have a drop in her hemoglobin; however, has received 12 units of PRBCs in the past year due to her
 myelodysplastic syndrome.  The patient was then cleared for discharge and was sent home in a stable 
condition.



Activity:  As tolerated, fall precautions.



Medications:  As per medication reconciliation list.



Diet:  Low sodium, 1500 mL fluid restriction.



Follow Up:  With PCP in 2-3 days.  Return to ER for worsening condition.



Physical Examination:

General:  Awake, alert, oriented, no acute distress.  Elderly female, obese. 

CV:  S1, S2.  No murmurs. 

Respiratory:  Moving air well bilaterally Gastrointestinal:  Abdomen is soft, nontender, nondistended
.  Positive bowel sounds. 

Extremities:  No clubbing, cyanosis, or edema. 

Neurologic:  Nonfocal.





SA/MODL

DD:  07/13/2018 12:07:48Voice ID:  [id number]

DT:  07/13/2018 12:21:35Report ID:  [id number]